# Patient Record
Sex: MALE | Race: WHITE | NOT HISPANIC OR LATINO | Employment: FULL TIME | URBAN - METROPOLITAN AREA
[De-identification: names, ages, dates, MRNs, and addresses within clinical notes are randomized per-mention and may not be internally consistent; named-entity substitution may affect disease eponyms.]

---

## 2018-05-26 ENCOUNTER — HOSPITAL ENCOUNTER (EMERGENCY)
Facility: HOSPITAL | Age: 53
Discharge: HOME | End: 2018-05-26
Attending: EMERGENCY MEDICINE
Payer: COMMERCIAL

## 2018-05-26 ENCOUNTER — APPOINTMENT (EMERGENCY)
Dept: RADIOLOGY | Facility: HOSPITAL | Age: 53
End: 2018-05-26
Attending: EMERGENCY MEDICINE
Payer: COMMERCIAL

## 2018-05-26 VITALS
DIASTOLIC BLOOD PRESSURE: 112 MMHG | RESPIRATION RATE: 20 BRPM | HEIGHT: 65 IN | HEART RATE: 79 BPM | SYSTOLIC BLOOD PRESSURE: 168 MMHG | WEIGHT: 124 LBS | OXYGEN SATURATION: 100 % | BODY MASS INDEX: 20.66 KG/M2 | TEMPERATURE: 99.9 F

## 2018-05-26 DIAGNOSIS — R79.89 ABNORMAL LIVER FUNCTION TEST: ICD-10-CM

## 2018-05-26 DIAGNOSIS — R07.9 CHEST PAIN, UNSPECIFIED TYPE: Primary | ICD-10-CM

## 2018-05-26 LAB
ALBUMIN SERPL-MCNC: 4.9 G/DL (ref 3.4–5)
ALP SERPL-CCNC: 36 IU/L (ref 35–126)
ALT SERPL-CCNC: 65 IU/L (ref 16–63)
ANION GAP SERPL CALC-SCNC: 14 MEQ/L (ref 3–15)
APTT PPP: 26 SEC. (ref 23–35)
AST SERPL-CCNC: 61 IU/L (ref 15–41)
BASOPHILS # BLD: 0.07 K/UL (ref 0.01–0.1)
BASOPHILS NFR BLD: 0.7 %
BILIRUB SERPL-MCNC: 0.9 MG/DL (ref 0.3–1.2)
BUN SERPL-MCNC: 10 MG/DL (ref 8–20)
CALCIUM SERPL-MCNC: 9.6 MG/DL (ref 8.9–10.3)
CHLORIDE SERPL-SCNC: 99 MMOL/L (ref 98–109)
CO2 SERPL-SCNC: 26 MMOL/L (ref 22–32)
CREAT SERPL-MCNC: 0.8 MG/DL (ref 0.8–1.3)
DIFFERENTIAL METHOD BLD: ABNORMAL
EOSINOPHIL # BLD: 0 K/UL (ref 0.04–0.54)
EOSINOPHIL NFR BLD: 0 %
ERYTHROCYTE [DISTWIDTH] IN BLOOD BY AUTOMATED COUNT: 12.5 % (ref 11.6–14.4)
GFR SERPL CREATININE-BSD FRML MDRD: >60 ML/MIN/1.73M*2
GLUCOSE SERPL-MCNC: 118 MG/DL (ref 70–99)
HCT VFR BLDCO AUTO: 44.7 % (ref 40–51)
HGB BLD-MCNC: 15.3 G/DL (ref 13.7–17.5)
IMM GRANULOCYTES # BLD AUTO: 0.03 K/UL (ref 0–0.08)
IMM GRANULOCYTES NFR BLD AUTO: 0.3 %
INR PPP: 0.9 INR
LYMPHOCYTES # BLD: 0.5 K/UL (ref 1.2–3.5)
LYMPHOCYTES NFR BLD: 5.1 %
MCH RBC QN AUTO: 33.3 PG (ref 28–33.2)
MCHC RBC AUTO-ENTMCNC: 34.2 G/DL (ref 32.2–36.5)
MCV RBC AUTO: 97.4 FL (ref 83–98)
MONOCYTES # BLD: 0.91 K/UL (ref 0.3–1)
MONOCYTES NFR BLD: 9.3 %
NEUTROPHILS # BLD: 8.26 K/UL (ref 1.7–7)
NEUTS SEG NFR BLD: 84.6 %
NRBC BLD-RTO: 0 %
PDW BLD AUTO: 9.2 FL (ref 9.4–12.4)
PLATELET # BLD AUTO: 201 K/UL (ref 150–350)
POTASSIUM SERPL-SCNC: 4.2 MMOL/L (ref 3.6–5.1)
PROT SERPL-MCNC: 8.6 G/DL (ref 6–8.2)
PROTHROMBIN TIME: 12.5 SEC. (ref 12.2–14.5)
RBC # BLD AUTO: 4.59 M/UL (ref 4.5–5.8)
SODIUM SERPL-SCNC: 139 MMOL/L (ref 136–144)
TROPONIN I SERPL-MCNC: <0.02 NG/ML
TROPONIN I SERPL-MCNC: <0.02 NG/ML
WBC # BLD AUTO: 9.77 K/UL (ref 3.8–10.5)

## 2018-05-26 PROCEDURE — 84484 ASSAY OF TROPONIN QUANT: CPT | Performed by: EMERGENCY MEDICINE

## 2018-05-26 PROCEDURE — 3E033NZ INTRODUCTION OF ANALGESICS, HYPNOTICS, SEDATIVES INTO PERIPHERAL VEIN, PERCUTANEOUS APPROACH: ICD-10-PCS | Performed by: EMERGENCY MEDICINE

## 2018-05-26 PROCEDURE — 93005 ELECTROCARDIOGRAM TRACING: CPT | Performed by: EMERGENCY MEDICINE

## 2018-05-26 PROCEDURE — 71046 X-RAY EXAM CHEST 2 VIEWS: CPT

## 2018-05-26 PROCEDURE — 85025 COMPLETE CBC W/AUTO DIFF WBC: CPT | Performed by: EMERGENCY MEDICINE

## 2018-05-26 PROCEDURE — 85610 PROTHROMBIN TIME: CPT | Performed by: EMERGENCY MEDICINE

## 2018-05-26 PROCEDURE — 3E0337Z INTRODUCTION OF ELECTROLYTIC AND WATER BALANCE SUBSTANCE INTO PERIPHERAL VEIN, PERCUTANEOUS APPROACH: ICD-10-PCS | Performed by: EMERGENCY MEDICINE

## 2018-05-26 PROCEDURE — 63600000 HC DRUGS/DETAIL CODE: Performed by: EMERGENCY MEDICINE

## 2018-05-26 PROCEDURE — 36415 COLL VENOUS BLD VENIPUNCTURE: CPT | Performed by: EMERGENCY MEDICINE

## 2018-05-26 PROCEDURE — 82040 ASSAY OF SERUM ALBUMIN: CPT | Performed by: EMERGENCY MEDICINE

## 2018-05-26 PROCEDURE — 96374 THER/PROPH/DIAG INJ IV PUSH: CPT

## 2018-05-26 PROCEDURE — 96361 HYDRATE IV INFUSION ADD-ON: CPT

## 2018-05-26 PROCEDURE — 25800000 HC PHARMACY IV SOLUTIONS: Performed by: EMERGENCY MEDICINE

## 2018-05-26 PROCEDURE — 99284 EMERGENCY DEPT VISIT MOD MDM: CPT | Mod: 25

## 2018-05-26 PROCEDURE — 85730 THROMBOPLASTIN TIME PARTIAL: CPT | Performed by: EMERGENCY MEDICINE

## 2018-05-26 RX ORDER — LORAZEPAM 2 MG/ML
0.5 INJECTION INTRAMUSCULAR ONCE
Status: COMPLETED | OUTPATIENT
Start: 2018-05-26 | End: 2018-05-26

## 2018-05-26 RX ADMIN — LORAZEPAM 0.5 MG: 2 INJECTION INTRAMUSCULAR at 11:59

## 2018-05-26 RX ADMIN — SODIUM CHLORIDE 500 ML: 9 INJECTION, SOLUTION INTRAVENOUS at 11:59

## 2018-05-26 NOTE — ED PROVIDER NOTES
HPI     Chief Complaint   Patient presents with   • Shaking     heart palpitations and uncontrolable shaking while in police custody.    • Chest Pain       52 y.o M w/ PMHx of essential tremor presents to the ED for evaluation of CP x1 day.  Pain is constant, substernal w/o radiation.   Pain occurred 2 days ago.  Pt has hx of essential tremors.        History provided by:  Patient   used: No    Chest Pain   Pain location:  Substernal area  Pain radiates to:  Does not radiate  Pain severity:  Unable to specify  Timing:  Unable to specify  Chronicity:  New  Relieved by:  None tried  Worsened by:  Nothing  Ineffective treatments:  None tried       Patient History     Past Medical History:   Diagnosis Date   • Alcoholism (CMS/HCC) (HCC)    • Essential tremor        Past Surgical History:   Procedure Laterality Date   • ADENOIDECTOMY         No family history on file.    Social History   Substance Use Topics   • Smoking status: Never Smoker   • Smokeless tobacco: Never Used   • Alcohol use 2.4 oz/week     4 Cans of beer per week      Comment: pt smells of etoh        Systems Reviewed from Nursing Triage:  Problems          Review of Systems     Review of Systems   Cardiovascular: Positive for chest pain.        Physical Exam     ED Triage Vitals   Temp Heart Rate Resp BP SpO2   05/26/18 1119 05/26/18 1119 05/26/18 1119 05/26/18 1119 05/26/18 1119   37.7 °C (99.9 °F) 67 16 (!) 180/113 95 %      Temp Source Heart Rate Source Patient Position BP Location FiO2 (%) (Set)   05/26/18 1119 -- 05/26/18 1604 05/26/18 1346 --   Oral  Lying Right upper arm        Pulse Ox %: 95 % (05/26/18 1130)  Pulse Ox Interpretation: Normal (05/26/18 1130)  Heart Rate: 67 (05/26/18 1130)       Patient Vitals for the past 24 hrs:   BP Temp Temp src Pulse Resp SpO2 Height Weight   05/26/18 1557 (!) 168/112 - - 79 20 100 % - -   05/26/18 1346 (!) 174/96 - - 85 14 97 % - -   05/26/18 1119 (!) 180/113 37.7 °C (99.9 °F) Oral 67 16  "95 % 1.651 m (5' 5\") 56.2 kg (124 lb)           Physical Exam   Constitutional: He appears well-developed and well-nourished. He is cooperative.  Non-toxic appearance. He does not have a sickly appearance. He does not appear ill. No distress.   HENT:   Head: Normocephalic and atraumatic.   Mouth/Throat: Mucous membranes are dry.   Cardiovascular: Normal rate, regular rhythm and normal heart sounds.    Pulmonary/Chest: Effort normal and breath sounds normal.   Abdominal: Soft. Normal appearance and bowel sounds are normal. There is no tenderness.   Neurological: He is alert. He is not disoriented. GCS eye subscore is 4. GCS verbal subscore is 5. GCS motor subscore is 6.   Intention tremor   Psychiatric: His speech is normal and behavior is normal. Thought content normal. His mood appears anxious.            Procedures    ED Course & MDM     Labs Reviewed   COMPREHENSIVE METABOLIC PANEL - Abnormal        Result Value    Glucose 118 (*)     AST (SGOT) 61 (*)     ALT (SGPT) 65 (*)     Total Protein 8.6 (*)     Sodium 139      Potassium 4.2      Chloride 99      CO2 26      BUN 10      Creatinine 0.8      Calcium 9.6      Alkaline Phosphatase 36      Albumin 4.9      Bilirubin, Total 0.9      eGFR >60.0      Anion Gap 14     CBC - Abnormal     MCH 33.3 (*)     MPV 9.2 (*)     WBC 9.77      RBC 4.59      Hemoglobin 15.3      Hematocrit 44.7      MCV 97.4      MCHC 34.2      RDW 12.5      Platelets 201     DIFF COUNT - Abnormal     Neutrophils, Absolute 8.26 (*)     Lymphocytes, Absolute 0.50 (*)     Eosinophils, Absolute 0.00 (*)     Differential Type Auto      nRBC 0.0      Immature Granulocytes 0.3      Neutrophils 84.6      Lymphocytes 5.1      Monocytes 9.3      Eosinophils 0.0      Basophils 0.7      Immature Granulocytes, Absolute 0.03      Monocytes, Absolute 0.91      Basophils, Absolute 0.07     PROTIME-INR - Normal    PT 12.5      INR 0.9     PTT - Normal    PTT 26     TROPONIN I - Normal    Troponin I <0.02   "   TROPONIN I - Normal    Troponin I <0.02     CBC AND DIFFERENTIAL    Narrative:     The following orders were created for panel order CBC and differential.  Procedure                               Abnormality         Status                     ---------                               -----------         ------                     CBC[[id number]]                           Abnormal            Final result               Diff Count[[id number]]                    Abnormal            Final result                 Please view results for these tests on the individual orders.       X-RAY CHEST 2 VIEWS   Final Result   IMPRESSION:   No acute cardiopulmonary disease.         EKG 12 lead   ED Interpretation   Sinus at 77 bpm normal axis deviation good R-wave progression              MDM  52 y.o M w/ PMHx of essential tremor presents to the ED for evaluation of CP x1 day.  Currently in police custody. r/o ACS, GERD, anxiety w/ Chest X-ray.    By signing my name below, I, Mandeep Godwin, attest that this documentation has been prepared under the direction and in the presence of Venecia Ibanez DO.    5/26/2018 9:08 PM      IVenecia, personally performed the services described in this documentation, as documented by the scribe in my presence, and it is both accurate and complete.  5/26/2018 9:08 PM           ED Course as of May 26 2108   Sat May 26, 2018   1138 Patient admitted to nursing staff that he drinks 4-1/2 beers daily.Patient is in place custody secondary to a DUI.  Patient was arrested earlier this morning, admits to drinking last evening.We will treat patient with IV fluids, Ativan.   [EK]   1351 Patient reevaluated.  Sitting comfortably no acute distress.  Patient states that his pain is resolved.  We will check 4 hour troponin.  [EK]   1544 Labs and EKG noted.  Repeat troponin is negative.  Patient to be discharged home with follow-up with PCP  [EK]      ED Course User Index  [EK] Venecia Ibanez DO         Clinical  Impressions as of May 26 2108   Chest pain, unspecified type   Abnormal liver function test     Disposition:  Discharge     Mandeep Citlali  05/26/18 1135       Venecia Ibanez DO  05/26/18 2109

## 2018-05-26 NOTE — DISCHARGE INSTRUCTIONS
Please call your PCP this week.  Please follow-up for further evaluation and treatment.  You had mildly elevated liver tests.  He will need further evaluation and workup.  In addition please, please call cardiology for further evaluation and treatment.  He may need a stress test

## 2018-05-27 LAB
ATRIAL RATE: 77
P AXIS: 69
PR INTERVAL: 136
QRS DURATION: 68
QT INTERVAL: 394
QTC CALCULATION(BAZETT): 445
R AXIS: 32
T WAVE AXIS: 57
VENTRICULAR RATE: 77

## 2021-04-15 DIAGNOSIS — Z23 ENCOUNTER FOR IMMUNIZATION: ICD-10-CM

## 2025-06-06 ENCOUNTER — HOSPITAL ENCOUNTER (EMERGENCY)
Facility: HOSPITAL | Age: 60
Discharge: HOME | End: 2025-06-06
Attending: EMERGENCY MEDICINE
Payer: COMMERCIAL

## 2025-06-06 ENCOUNTER — APPOINTMENT (EMERGENCY)
Dept: RADIOLOGY | Facility: HOSPITAL | Age: 60
End: 2025-06-06
Payer: COMMERCIAL

## 2025-06-06 VITALS
SYSTOLIC BLOOD PRESSURE: 140 MMHG | OXYGEN SATURATION: 98 % | TEMPERATURE: 97.4 F | HEIGHT: 66 IN | DIASTOLIC BLOOD PRESSURE: 87 MMHG | BODY MASS INDEX: 21.05 KG/M2 | WEIGHT: 131 LBS | HEART RATE: 82 BPM | RESPIRATION RATE: 20 BRPM

## 2025-06-06 DIAGNOSIS — R07.9 CHEST PAIN, UNSPECIFIED TYPE: Primary | ICD-10-CM

## 2025-06-06 LAB
ALBUMIN SERPL-MCNC: 3.8 G/DL (ref 3.5–5.7)
ALP SERPL-CCNC: 33 IU/L (ref 34–125)
ALT SERPL-CCNC: 25 IU/L (ref 7–52)
ANION GAP SERPL CALC-SCNC: 8 MEQ/L (ref 3–15)
AST SERPL-CCNC: 31 IU/L (ref 13–39)
BASOPHILS # BLD: 0.04 K/UL (ref 0.01–0.1)
BASOPHILS NFR BLD: 0.8 %
BILIRUB SERPL-MCNC: 0.3 MG/DL (ref 0.3–1.2)
BUN SERPL-MCNC: 8 MG/DL (ref 7–25)
CALCIUM SERPL-MCNC: 8.1 MG/DL (ref 8.6–10.3)
CHLORIDE SERPL-SCNC: 99 MEQ/L (ref 98–107)
CO2 SERPL-SCNC: 23 MEQ/L (ref 21–31)
CREAT SERPL-MCNC: 0.8 MG/DL (ref 0.7–1.3)
DIFFERENTIAL METHOD BLD: ABNORMAL
EGFRCR SERPLBLD CKD-EPI 2021: >60 ML/MIN/1.73M*2
EOSINOPHIL # BLD: 0.05 K/UL (ref 0.04–0.54)
EOSINOPHIL NFR BLD: 1 %
ERYTHROCYTE [DISTWIDTH] IN BLOOD BY AUTOMATED COUNT: 13.2 % (ref 11.6–14.4)
GLUCOSE SERPL-MCNC: 92 MG/DL (ref 70–99)
HCT VFR BLD AUTO: 33.2 % (ref 40.1–51)
HGB BLD-MCNC: 11.9 G/DL (ref 13.7–17.5)
IMM GRANULOCYTES # BLD AUTO: 0.02 K/UL (ref 0–0.08)
IMM GRANULOCYTES NFR BLD AUTO: 0.4 %
LIPASE SERPL-CCNC: 60 U/L (ref 11–82)
LYMPHOCYTES # BLD: 1.21 K/UL (ref 1.2–3.5)
LYMPHOCYTES NFR BLD: 23.3 %
MCH RBC QN AUTO: 34 PG (ref 28–33.2)
MCHC RBC AUTO-ENTMCNC: 35.8 G/DL (ref 32.2–36.5)
MCV RBC AUTO: 94.9 FL (ref 83–98)
MONOCYTES # BLD: 0.57 K/UL (ref 0.3–1)
MONOCYTES NFR BLD: 11 %
NEUTROPHILS # BLD: 3.31 K/UL (ref 1.7–7)
NEUTS SEG NFR BLD: 63.5 %
NRBC BLD-RTO: 0 %
PLATELET # BLD AUTO: 181 K/UL (ref 150–350)
PMV BLD AUTO: 9.1 FL (ref 9.4–12.4)
POTASSIUM SERPL-SCNC: 4.1 MEQ/L (ref 3.5–5.1)
PROT SERPL-MCNC: 6.2 G/DL (ref 6–8.2)
RBC # BLD AUTO: 3.5 M/UL (ref 4.5–5.8)
SODIUM SERPL-SCNC: 130 MEQ/L (ref 136–145)
TROPONIN I SERPL HS-MCNC: 6 PG/ML
TROPONIN I SERPL HS-MCNC: 7.2 PG/ML
WBC # BLD AUTO: 5.2 K/UL (ref 3.8–10.5)

## 2025-06-06 PROCEDURE — 71045 X-RAY EXAM CHEST 1 VIEW: CPT

## 2025-06-06 PROCEDURE — 84484 ASSAY OF TROPONIN QUANT: CPT | Performed by: PHYSICIAN ASSISTANT

## 2025-06-06 PROCEDURE — 93005 ELECTROCARDIOGRAM TRACING: CPT | Performed by: PHYSICIAN ASSISTANT

## 2025-06-06 PROCEDURE — 85025 COMPLETE CBC W/AUTO DIFF WBC: CPT | Performed by: PHYSICIAN ASSISTANT

## 2025-06-06 PROCEDURE — 36415 COLL VENOUS BLD VENIPUNCTURE: CPT

## 2025-06-06 PROCEDURE — 80053 COMPREHEN METABOLIC PANEL: CPT | Performed by: PHYSICIAN ASSISTANT

## 2025-06-06 PROCEDURE — 84484 ASSAY OF TROPONIN QUANT: CPT | Mod: 91 | Performed by: PHYSICIAN ASSISTANT

## 2025-06-06 PROCEDURE — 99283 EMERGENCY DEPT VISIT LOW MDM: CPT | Mod: 25

## 2025-06-06 PROCEDURE — 83690 ASSAY OF LIPASE: CPT | Performed by: PHYSICIAN ASSISTANT

## 2025-06-06 ASSESSMENT — ENCOUNTER SYMPTOMS
DIARRHEA: 0
SHORTNESS OF BREATH: 1
VOMITING: 0
ABDOMINAL PAIN: 0
FEVER: 0
DIZZINESS: 0

## 2025-06-06 NOTE — ED PROVIDER NOTES
"Emergency Medicine Note  HPI   HISTORY OF PRESENT ILLNESS     58 y/o M with PMH CAD s/p CABG in 2024, alcohol abuse, hypertension presents today for evaluation of chest pain. Pt states 2 hours prior to arrival while fixing a lawnmower started with substernal chest pain described as \"mild\" with associated difficulty breathing. Denies vomiting during episode but did have episode of nausea and vomiting earlier today. Denies dizziness or LOC. Pt called EMS and by the time they arrived chest pain much improved. Pt given 1 SL nitro and 324 mg ASA but per patient chest pain was essentially subsided by the time they arrived.       History provided by:  Patient  Chest Pain  Associated symptoms: shortness of breath    Associated symptoms: no abdominal pain, no dizziness, no fever and no vomiting          Patient History   PAST HISTORY     Reviewed from Nursing Triage:       Past Medical History:   Diagnosis Date    Alcoholism (CMS/HCC) (HCC)     Essential tremor        Past Surgical History   Procedure Laterality Date    Adenoidectomy         No family history on file.    Social History     Tobacco Use    Smoking status: Never    Smokeless tobacco: Never   Substance Use Topics    Alcohol use: Yes     Alcohol/week: 4.0 standard drinks of alcohol     Types: 4 Cans of beer per week     Comment: pt smells of etoh     Drug use: No         Review of Systems   REVIEW OF SYSTEMS     Review of Systems   Constitutional:  Negative for fever.   Respiratory:  Positive for shortness of breath.    Cardiovascular:  Positive for chest pain. Negative for leg swelling.   Gastrointestinal:  Negative for abdominal pain, diarrhea and vomiting.   Neurological:  Negative for dizziness and syncope.         VITALS     ED Vitals      Date/Time Temp Pulse Resp BP SpO2 [facility]   06/06/25 1700 -- 70 20 113/70 99 % [facility]   06/06/25 1600 -- 62 20 106/61 96 % [facility]   06/06/25 1531 -- 64 16 119/79 98 % [facility]   06/06/25 1500 -- 67 18 125/86 98 % [facility]   06/06/25 1453 36.3 " °C (97.4 °F) 66 18 114/72 97 % [facility]          Pulse Ox %: 97 % (06/06/25 1454)  Pulse Ox Interpretation: Normal (06/06/25 1454)  Heart Rate: 68 (06/06/25 1454)  Rhythm Strip Interpretation: Normal Sinus Rhythm (06/06/25 1454)     Physical Exam   PHYSICAL EXAM     Physical Exam  Vitals and nursing note reviewed.   Constitutional:       General: He is not in acute distress.     Appearance: Normal appearance. He is well-developed.   HENT:      Head: Normocephalic.   Cardiovascular:      Rate and Rhythm: Normal rate and regular rhythm.      Pulses: Normal pulses.   Pulmonary:      Effort: Pulmonary effort is normal.      Breath sounds: Normal breath sounds.   Abdominal:      General: There is no distension.      Palpations: Abdomen is soft.      Tenderness: There is no abdominal tenderness. There is no guarding.   Musculoskeletal:      Cervical back: Normal range of motion and neck supple.      Right lower leg: No tenderness. No edema.      Left lower leg: No tenderness. No edema.   Skin:     General: Skin is warm and dry.   Neurological:      General: No focal deficit present.      Mental Status: He is alert and oriented to person, place, and time.           PROCEDURES     Procedures     DATA     Results       Procedure Component Value Units Date/Time    Comprehensive metabolic panel [[id number]]  (Abnormal) Collected: 06/06/25 1454    Specimen: Blood, Venous Updated: 06/06/25 1545     Sodium 130 mEQ/L      Potassium 4.1 mEQ/L      Comment: Results obtained on plasma. Plasma Potassium values may be up to 0.4 mEQ/L less than serum values. The differences may be greater for patients with high platelet or white cell counts.        Chloride 99 mEQ/L      CO2 23 mEQ/L      BUN 8 mg/dL      Creatinine 0.8 mg/dL      Glucose 92 mg/dL      Calcium 8.1 mg/dL      AST (SGOT) 31 IU/L      ALT (SGPT) 25 IU/L      Alkaline Phosphatase 33 IU/L      Total Protein 6.2 g/dL      Comment: Test performed on plasma which typically contains  approximately 0.4 g/dL more protein than serum.        Albumin 3.8 g/dL      Bilirubin, Total 0.3 mg/dL      eGFR >60.0 mL/min/1.73m*2      Comment: Calculation based on the Chronic Kidney Disease Epidemiology Collaboration (CKD-EPI) equation refit without adjustment for race.        Anion Gap 8 mEQ/L     Lipase [[id number]]  (Normal) Collected: 06/06/25 1454    Specimen: Blood, Venous Updated: 06/06/25 1545     Lipase 60 U/L     HS Troponin (with 2 hour reflex) [[id number]]  (Normal) Collected: 06/06/25 1454    Specimen: Blood, Venous Updated: 06/06/25 1536     High Sens Troponin I 6.0 pg/mL     CBC and differential [[id number]]  (Abnormal) Collected: 06/06/25 1454    Specimen: Blood, Venous Updated: 06/06/25 1508     WBC 5.20 K/uL      RBC 3.50 M/uL      Hemoglobin 11.9 g/dL      Hematocrit 33.2 %      MCV 94.9 fL      MCH 34.0 pg      MCHC 35.8 g/dL      RDW 13.2 %      Platelets 181 K/uL      MPV 9.1 fL      Differential Type Auto     nRBC 0.0 %      Immature Granulocytes 0.4 %      Neutrophils 63.5 %      Lymphocytes 23.3 %      Monocytes 11.0 %      Eosinophils 1.0 %      Basophils 0.8 %      Immature Granulocytes, Absolute 0.02 K/uL      Neutrophils, Absolute 3.31 K/uL      Lymphocytes, Absolute 1.21 K/uL      Monocytes, Absolute 0.57 K/uL      Eosinophils, Absolute 0.05 K/uL      Basophils, Absolute 0.04 K/uL     Extra Tubes [[id number]] Collected: 06/06/25 1455    Specimen: Blood, Venous Updated: 06/06/25 1500    Narrative:      The following orders were created for panel order Extra Tubes.  Procedure                               Abnormality         Status                     ---------                               -----------         ------                     Extra Tube Lt Blue[[id number]]                                In process                   Please view results for these tests on the individual orders.    Extra Tube Lt Blue [[id number]] Collected: 06/06/25 1455    Specimen: Blood, Venous Updated: 06/06/25  1500            Imaging Results              X-RAY CHEST 1 VIEW (Final result)  Result time 06/06/25 15:46:01      Final result                   Impression:    IMPRESSION:  1.  Grossly clear lungs.  2.  Age-indeterminate fracture deformity of the right lateral third rib.  Additional adjacent rib deformities inferior to this could not be entirely  excluded.  Correlate with clinical findings.               Narrative:    CLINICAL HISTORY: chest pain   .    COMMENT: AP chest radiograph was obtained. Comparison is made with radiographs  dated 5/26/2018.    The lungs are grossly clear, and there is no large pleural effusion or  pneumothorax. Heart size and pulmonary vascularity are normal, status post  coronary artery bypass graft. The upper abdomen is grossly unremarkable.  There  are age-indeterminate deformities of the right lateral third rib and possibly  the inferior adjacent ribs.                                      ECG 12 lead          Scoring tools                                  ED Course & MDM   MDM / ED COURSE / CLINICAL IMPRESSION / DISPO     Medical Decision Making  Amount and/or Complexity of Data Reviewed  Labs: ordered. Decision-making details documented in ED Course.  Radiology: ordered.  ECG/medicine tests: ordered.        ED Course as of 06/06/25 1829   Fri Jun 06, 2025   1505 Pt presents today for evaluation of chest pain. H/o CAD. Pt chest pain free currently  Pt in NSR. EKG no stemi  Plan for labs, CXR and cardiac monitoring  Pt agrees with plan [NH]   1538 High Sens Troponin I: 6.0  2 hour repeat pending  [NH]   1820 High Sens Troponin I: 7.2  Delta flat  Remains chest pain free  Offered hospitalization for continued cardiac monitoring. Pt prefers discharge home. He will call his cardiologist on Monday  Strict return instructions provided  Pt agrees with plan [NH]      ED Course User Index  [NH] Yamila Rosario PA C     Clinical Impression      Chest pain, unspecified type      _________________       ED Disposition   Discharge                       Yamila Rosario PA C  06/06/25 182

## 2025-06-06 NOTE — ED ATTESTATION NOTE
The patient was evaluated and managed by the physician assistant under my supervision.   I agree with the PA's assessment and plan.   Seen and examined by myself.    Hx CAD/CABG, HTN presents with chest pain.  Was fixing  and had 15 minutes cp.  Slight sob.  Symptoms resolved before ems got there.  No symptoms since.  Compliant with meds.  Feeling well now.       Exam:  NAD  RRR  Lungs clear  Abdomen nontender  No LE edema    A/p:  hx cabg with 15 minutes chest pain.  No ischemic EKG changes.  Troponin neg x2.  Symptom free in ED.  Discussed obs admit vs discharge and close cardiology follow up and return immediately for any returning chest pain or symptoms.  Pt preferred discharge home and close follow up.  No signs acs/MI.  I don't suspect PE/aortic dissection or life threatening process.      Jasson Davila MD  06/06/25 2659

## 2025-06-07 LAB
ATRIAL RATE: 68
P AXIS: 48
PR INTERVAL: 196
QRS DURATION: 134
QT INTERVAL: 440
QTC CALCULATION(BAZETT): 467
R AXIS: 1
T WAVE AXIS: 67
VENTRICULAR RATE: 68

## 2025-06-07 PROCEDURE — 93010 ELECTROCARDIOGRAM REPORT: CPT | Performed by: INTERNAL MEDICINE
